# Patient Record
Sex: FEMALE | Race: WHITE | NOT HISPANIC OR LATINO | Employment: STUDENT | URBAN - METROPOLITAN AREA
[De-identification: names, ages, dates, MRNs, and addresses within clinical notes are randomized per-mention and may not be internally consistent; named-entity substitution may affect disease eponyms.]

---

## 2018-06-15 ENCOUNTER — OFFICE VISIT (OUTPATIENT)
Dept: FAMILY MEDICINE | Facility: CLINIC | Age: 18
End: 2018-06-15
Payer: COMMERCIAL

## 2018-06-15 ENCOUNTER — DOCUMENTATION ONLY (OUTPATIENT)
Dept: FAMILY MEDICINE | Facility: CLINIC | Age: 18
End: 2018-06-15

## 2018-06-15 ENCOUNTER — LAB VISIT (OUTPATIENT)
Dept: LAB | Facility: HOSPITAL | Age: 18
End: 2018-06-15
Attending: PHYSICIAN ASSISTANT
Payer: COMMERCIAL

## 2018-06-15 DIAGNOSIS — F41.9 ANXIETY: ICD-10-CM

## 2018-06-15 DIAGNOSIS — Z86.39 HISTORY OF IRON DEFICIENCY: ICD-10-CM

## 2018-06-15 DIAGNOSIS — F32.A DEPRESSION, UNSPECIFIED DEPRESSION TYPE: Primary | ICD-10-CM

## 2018-06-15 DIAGNOSIS — R53.83 FATIGUE, UNSPECIFIED TYPE: ICD-10-CM

## 2018-06-15 DIAGNOSIS — G47.19 EXCESSIVE DAYTIME SLEEPINESS: ICD-10-CM

## 2018-06-15 LAB
25(OH)D3+25(OH)D2 SERPL-MCNC: 19 NG/ML
ALBUMIN SERPL BCP-MCNC: 4.1 G/DL
ALP SERPL-CCNC: 66 U/L
ALT SERPL W/O P-5'-P-CCNC: 19 U/L
ANION GAP SERPL CALC-SCNC: 9 MMOL/L
AST SERPL-CCNC: 23 U/L
BASOPHILS # BLD AUTO: 0.05 K/UL
BASOPHILS NFR BLD: 0.5 %
BILIRUB SERPL-MCNC: 0.6 MG/DL
BUN SERPL-MCNC: 8 MG/DL
CALCIUM SERPL-MCNC: 9.5 MG/DL
CHLORIDE SERPL-SCNC: 106 MMOL/L
CO2 SERPL-SCNC: 24 MMOL/L
CREAT SERPL-MCNC: 0.8 MG/DL
DIFFERENTIAL METHOD: ABNORMAL
EOSINOPHIL # BLD AUTO: 0.1 K/UL
EOSINOPHIL NFR BLD: 1.2 %
ERYTHROCYTE [DISTWIDTH] IN BLOOD BY AUTOMATED COUNT: 12.8 %
EST. GFR  (AFRICAN AMERICAN): >60 ML/MIN/1.73 M^2
EST. GFR  (NON AFRICAN AMERICAN): >60 ML/MIN/1.73 M^2
FERRITIN SERPL-MCNC: 36 NG/ML
GLUCOSE SERPL-MCNC: 78 MG/DL
HCT VFR BLD AUTO: 43.7 %
HGB BLD-MCNC: 14.2 G/DL
IMM GRANULOCYTES # BLD AUTO: 0.06 K/UL
IMM GRANULOCYTES NFR BLD AUTO: 0.6 %
IRON SERPL-MCNC: 88 UG/DL
LYMPHOCYTES # BLD AUTO: 3.2 K/UL
LYMPHOCYTES NFR BLD: 30.4 %
MCH RBC QN AUTO: 28.5 PG
MCHC RBC AUTO-ENTMCNC: 32.5 G/DL
MCV RBC AUTO: 88 FL
MONOCYTES # BLD AUTO: 0.6 K/UL
MONOCYTES NFR BLD: 5.9 %
NEUTROPHILS # BLD AUTO: 6.5 K/UL
NEUTROPHILS NFR BLD: 61.4 %
NRBC BLD-RTO: 0 /100 WBC
PLATELET # BLD AUTO: 261 K/UL
PMV BLD AUTO: 13.9 FL
POTASSIUM SERPL-SCNC: 3.8 MMOL/L
PROT SERPL-MCNC: 7.5 G/DL
RBC # BLD AUTO: 4.98 M/UL
SATURATED IRON: 19 %
SODIUM SERPL-SCNC: 139 MMOL/L
TOTAL IRON BINDING CAPACITY: 466 UG/DL
TRANSFERRIN SERPL-MCNC: 315 MG/DL
TSH SERPL DL<=0.005 MIU/L-ACNC: 0.91 UIU/ML
VIT B12 SERPL-MCNC: 528 PG/ML
WBC # BLD AUTO: 10.59 K/UL

## 2018-06-15 PROCEDURE — 82607 VITAMIN B-12: CPT

## 2018-06-15 PROCEDURE — 3008F BODY MASS INDEX DOCD: CPT | Mod: CPTII,S$GLB,, | Performed by: PHYSICIAN ASSISTANT

## 2018-06-15 PROCEDURE — 99204 OFFICE O/P NEW MOD 45 MIN: CPT | Mod: S$GLB,,, | Performed by: PHYSICIAN ASSISTANT

## 2018-06-15 PROCEDURE — 80053 COMPREHEN METABOLIC PANEL: CPT

## 2018-06-15 PROCEDURE — 85025 COMPLETE CBC W/AUTO DIFF WBC: CPT

## 2018-06-15 PROCEDURE — 82728 ASSAY OF FERRITIN: CPT

## 2018-06-15 PROCEDURE — 36415 COLL VENOUS BLD VENIPUNCTURE: CPT | Mod: PO

## 2018-06-15 PROCEDURE — 82306 VITAMIN D 25 HYDROXY: CPT

## 2018-06-15 PROCEDURE — 99999 PR PBB SHADOW E&M-NEW PATIENT-LVL III: CPT | Mod: PBBFAC,,, | Performed by: PHYSICIAN ASSISTANT

## 2018-06-15 PROCEDURE — 83540 ASSAY OF IRON: CPT

## 2018-06-15 PROCEDURE — 84443 ASSAY THYROID STIM HORMONE: CPT

## 2018-06-15 RX ORDER — ESCITALOPRAM OXALATE 10 MG/1
10 TABLET ORAL DAILY
Qty: 30 TABLET | Refills: 0 | Status: SHIPPED | OUTPATIENT
Start: 2018-06-15 | End: 2018-07-06 | Stop reason: SDUPTHER

## 2018-06-15 RX ORDER — BUSPIRONE HYDROCHLORIDE 7.5 MG/1
7.5 TABLET ORAL 3 TIMES DAILY PRN
Qty: 90 TABLET | Refills: 0 | Status: SHIPPED | OUTPATIENT
Start: 2018-06-15 | End: 2018-07-06

## 2018-06-15 RX ORDER — IBUPROFEN 600 MG/1
600 TABLET ORAL 3 TIMES DAILY
COMMUNITY

## 2018-06-15 NOTE — PROGRESS NOTES
Pre-Visit Chart Review  For Appointment Scheduled on 06/15/18    There are no preventive care reminders to display for this patient.

## 2018-06-15 NOTE — PROGRESS NOTES
"Subjective:       Patient ID: Veena Guerrero is a 17 y.o. female.    Chief Complaint: Depression    HPI   Patient is a 17 year old  female presenting to the clinic with her mother "because of near of a breakdown." Patient reports having increased stress/depression build up over the past few months due to issues with her job/living situation. This got suddenly worse within the last few days after the break-up of her boyfriend. Patient states she has not been able to eat, sleep, get out of bed. Patient had signed a release with her boyfriend in [place], [place] and now she has no way of paying for the apartment. She doesn't think she will be able to keep her (2) jobs in [place], [place] or be able to continue with school that she is enrolled in. Her mother is very concerned, but is a  and is currently housing 5 foster children for which patient would have to share a room with. She feels like she has no where to go. There are possible options with a brother that lives in [place] or a sister that lives [place], [place]. She denies any [place], [place].    aPtient also expresses concern regarding near falling asleep while driving between jobs. She states this has been going on for months and is concerned that maybe her iron is low.  Review of Systems   Constitutional: Negative for activity change, appetite change, chills, diaphoresis, fatigue and fever.   HENT: Negative for congestion, postnasal drip and rhinorrhea.    Respiratory: Negative.  Negative for cough, shortness of breath and wheezing.    Cardiovascular: Negative.  Negative for chest pain.   Gastrointestinal: Negative for abdominal pain, blood in stool, constipation, diarrhea, nausea and vomiting.   Genitourinary: Negative for dysuria, frequency, hematuria and urgency.   Musculoskeletal: Negative.    Skin: Negative.  Negative for color change and rash.   Neurological: Negative for dizziness and syncope.   Psychiatric/Behavioral: Positive for dysphoric mood " and sleep disturbance. Negative for agitation, behavioral problems, confusion, self-injury and suicidal ideas. The patient is nervous/anxious.        Objective:      Physical Exam   Constitutional: Vital signs are normal. She appears well-developed and well-nourished. No distress.   Cardiovascular: Normal rate, regular rhythm, S1 normal, S2 normal and normal heart sounds.  Exam reveals no gallop.    No murmur heard.  Pulses:       Radial pulses are 2+ on the right side, and 2+ on the left side.   <2sec cap refill fingers bilat     Pulmonary/Chest: Effort normal and breath sounds normal. No respiratory distress. She has no wheezes. She has no rhonchi.   Skin: Skin is warm and dry. She is not diaphoretic.   Appropriate skin turgor   Psychiatric: Her speech is normal and behavior is normal. Judgment and thought content normal. Thought content is not paranoid and not delusional. Cognition and memory are normal. She exhibits a depressed mood. She expresses no homicidal and no suicidal ideation. She expresses no suicidal plans and no homicidal plans.   Patient tearful in clinic       Assessment:       1. Depression, unspecified depression type    2. Fatigue, unspecified type    3. Excessive daytime sleepiness    4. History of iron deficiency        Plan:       Veena VIDAL was seen today for depression.    Diagnoses and all orders for this visit:    Depression, unspecified depression type  -     CBC auto differential; Future  -     Comprehensive metabolic panel; Future  -     TSH; Future    -     escitalopram oxalate (LEXAPRO) 10 MG tablet; Take 1 tablet (10 mg total) by mouth once daily.  -     busPIRone (BUSPAR) 7.5 MG tablet; Take 1 tablet (7.5 mg total) by mouth 3 (three) times daily as needed.  I've explained to her that drugs of the SSRI class can have side effects such as weight gain, sexual dysfunction, insomnia, headache, nausea. These medications are generally effective at alleviating symptoms of anxiety and/or  depression. Let me know if significant side effects do occur.  .3-4 week followup scheduled          Fatigue, unspecified type  -     CBC auto differential; Future  -     Comprehensive metabolic panel; Future  -     TSH; Future  -     Vitamin D; Future  -     Iron and TIBC; Future  -     Ferritin; Future  -     Vitamin B12; Future    Excessive daytime sleepiness  -     CBC auto differential; Future  -     Comprehensive metabolic panel; Future  -     TSH; Future  -     Vitamin D; Future  -     Iron and TIBC; Future  -     Ferritin; Future    History of iron deficiency  -     Iron and TIBC; Future  -     Ferritin; Future    Other orders  -     escitalopram oxalate (LEXAPRO) 10 MG tablet; Take 1 tablet (10 mg total) by mouth once daily.  -     busPIRone (BUSPAR) 7.5 MG tablet; Take 1 tablet (7.5 mg total) by mouth 3 (three) times daily as needed.

## 2018-06-17 VITALS
WEIGHT: 154.13 LBS | HEART RATE: 82 BPM | TEMPERATURE: 98 F | SYSTOLIC BLOOD PRESSURE: 126 MMHG | DIASTOLIC BLOOD PRESSURE: 66 MMHG | HEIGHT: 59 IN | BODY MASS INDEX: 31.07 KG/M2

## 2018-06-18 ENCOUNTER — TELEPHONE (OUTPATIENT)
Dept: FAMILY MEDICINE | Facility: CLINIC | Age: 18
End: 2018-06-18

## 2018-06-18 DIAGNOSIS — R79.89 LOW VITAMIN D LEVEL: Primary | ICD-10-CM

## 2018-06-18 DIAGNOSIS — R79.89 LOW VITAMIN D LEVEL: ICD-10-CM

## 2018-06-18 RX ORDER — ERGOCALCIFEROL 1.25 MG/1
50000 CAPSULE ORAL
Qty: 12 CAPSULE | Refills: 1 | Status: SHIPPED | OUTPATIENT
Start: 2018-06-18

## 2018-06-18 RX ORDER — ERGOCALCIFEROL 1.25 MG/1
50000 CAPSULE ORAL
Qty: 12 CAPSULE | Refills: 1 | Status: SHIPPED | OUTPATIENT
Start: 2018-06-18 | End: 2018-06-18 | Stop reason: SDUPTHER

## 2018-06-18 NOTE — TELEPHONE ENCOUNTER
----- Message from ML Dorado sent at 6/18/2018  7:50 AM CDT -----  Blood counts, electrolytes, thyroid, b12 all normal.   Iron is only very minimally decreased and I do not think this is the cause of her fatigue.  Vitamin D is low. Needs to start high dose vitamin D (50,000 units/weekly) and repeat this lab in 12 weeks.

## 2018-06-18 NOTE — TELEPHONE ENCOUNTER
Spoke to patient.   Patient notified of results and states understanding.  Requesting vitamin D rx [facility] in [place]

## 2018-07-05 ENCOUNTER — DOCUMENTATION ONLY (OUTPATIENT)
Dept: FAMILY MEDICINE | Facility: CLINIC | Age: 18
End: 2018-07-05

## 2018-07-05 NOTE — PROGRESS NOTES
Pre-Visit Chart Review  For Appointment Scheduled on 07/06/18    Health Maintenance Due   Topic Date Due    Lipid Panel  1998    HPV Vaccines (1 of 3 - Female 3 Dose Series) 09/16/2009    TETANUS VACCINE  09/16/2016

## 2018-07-06 ENCOUNTER — TELEPHONE (OUTPATIENT)
Dept: FAMILY MEDICINE | Facility: CLINIC | Age: 18
End: 2018-07-06

## 2018-07-06 ENCOUNTER — OFFICE VISIT (OUTPATIENT)
Dept: FAMILY MEDICINE | Facility: CLINIC | Age: 18
End: 2018-07-06
Payer: COMMERCIAL

## 2018-07-06 VITALS
SYSTOLIC BLOOD PRESSURE: 136 MMHG | DIASTOLIC BLOOD PRESSURE: 88 MMHG | WEIGHT: 155.88 LBS | HEIGHT: 59 IN | TEMPERATURE: 98 F | HEART RATE: 73 BPM | BODY MASS INDEX: 31.43 KG/M2

## 2018-07-06 DIAGNOSIS — F41.8 MIXED ANXIETY AND DEPRESSIVE DISORDER: Primary | ICD-10-CM

## 2018-07-06 PROCEDURE — 99999 PR PBB SHADOW E&M-EST. PATIENT-LVL III: CPT | Mod: PBBFAC,,, | Performed by: PHYSICIAN ASSISTANT

## 2018-07-06 PROCEDURE — 3008F BODY MASS INDEX DOCD: CPT | Mod: CPTII,S$GLB,, | Performed by: PHYSICIAN ASSISTANT

## 2018-07-06 PROCEDURE — 99214 OFFICE O/P EST MOD 30 MIN: CPT | Mod: S$GLB,,, | Performed by: PHYSICIAN ASSISTANT

## 2018-07-06 RX ORDER — BUSPIRONE HYDROCHLORIDE 15 MG/1
15 TABLET ORAL 2 TIMES DAILY
Qty: 180 TABLET | Refills: 1 | Status: SHIPPED | OUTPATIENT
Start: 2018-07-06 | End: 2019-07-06

## 2018-07-06 RX ORDER — ESCITALOPRAM OXALATE 10 MG/1
10 TABLET ORAL DAILY
Qty: 90 TABLET | Refills: 1 | Status: SHIPPED | OUTPATIENT
Start: 2018-07-06 | End: 2019-07-06

## 2018-07-06 NOTE — TELEPHONE ENCOUNTER
----- Message from Adair Prieto sent at 7/6/2018 10:07 AM CDT -----  Contact: Pt  Patient calling had a 10 oclock appointment, had a flat tire really would like to get in today, would like to be seen by anyone in the office please   [phone]

## 2018-07-06 NOTE — TELEPHONE ENCOUNTER
----- Message from Katty Live sent at 7/6/2018  9:48 AM CDT -----  Contact: Patient  Patient is calling to see if she can be a little late to her appointment because [place] and then heading there.  Call Back#[phone]  Thanks

## 2018-07-06 NOTE — PROGRESS NOTES
Subjective:       Patient ID: Veena Guerrero is a 17 y.o. female.    Chief Complaint: Follow-up    HPI   Patient is a 17 year old  female presenting to the clinic for follow-up BRISA. She was started on lexapro 10mg daily and BuSpar 7.5mg TID. She reports good improvement in the last month. She got a raise/promotion at work and is now able to live closer to her apartment. She reports that her boyfriend and her are getting back together/taking things slow, but she is focusing on herself. She reports good improvement with the medications, but feels like lexapro may be keeping her from sleeping. She is taking BuSpar 7.5mg in the morning and 15mg at bedtime with good relief.   Review of Systems   Constitutional: Negative for activity change, appetite change, chills, diaphoresis, fatigue and fever.   HENT: Negative for congestion, postnasal drip and rhinorrhea.    Respiratory: Negative.  Negative for cough, shortness of breath and wheezing.    Cardiovascular: Negative.  Negative for chest pain.   Gastrointestinal: Negative for abdominal pain, blood in stool, constipation, diarrhea, nausea and vomiting.   Genitourinary: Negative for dysuria, frequency, hematuria and urgency.   Musculoskeletal: Negative.    Skin: Negative.  Negative for color change and rash.   Neurological: Negative for dizziness and syncope.   Psychiatric/Behavioral: Positive for sleep disturbance. Negative for agitation, behavioral problems and confusion.       Objective:      Physical Exam   Constitutional: Vital signs are normal. She appears well-developed and well-nourished. No distress.   Cardiovascular: Normal rate, regular rhythm, S1 normal, S2 normal and normal heart sounds.  Exam reveals no gallop.    No murmur heard.  Pulses:       Radial pulses are 2+ on the right side, and 2+ on the left side.   <2sec cap refill fingers bilat     Pulmonary/Chest: Effort normal and breath sounds normal. No respiratory distress. She has no wheezes.  She has no rhonchi.   Skin: Skin is warm and dry. She is not diaphoretic.   Appropriate skin turgor   Psychiatric: She has a normal mood and affect. Her speech is normal and behavior is normal. Judgment and thought content normal. Cognition and memory are normal.       Assessment:       1. Mixed anxiety and depressive disorder        Plan:       Veena VIDAL was seen today for follow-up.    Diagnoses and all orders for this visit:    Mixed anxiety and depressive disorder  Improving; switch to lexapro to AM dosing  Increase BuSpar to 15mg BID as needed for anxiety  -     busPIRone (BUSPAR) 15 MG tablet; Take 1 tablet (15 mg total) by mouth 2 (two) times daily.  -     escitalopram oxalate (LEXAPRO) 10 MG tablet; Take 1 tablet (10 mg total) by mouth once daily.    Patient will email/call us if she decides she needs to increase the lexapro or still having difficulty switching after switching to taking the medication in the AM.

## 2018-07-31 ENCOUNTER — PATIENT MESSAGE (OUTPATIENT)
Dept: FAMILY MEDICINE | Facility: CLINIC | Age: 18
End: 2018-07-31

## 2018-07-31 NOTE — TELEPHONE ENCOUNTER
Please review pts My Chart message and advise. [facility] [place] [facility]. (I have some at my desk.) Thanks, Jailyn

## 2018-08-01 ENCOUNTER — PATIENT MESSAGE (OUTPATIENT)
Dept: FAMILY MEDICINE | Facility: CLINIC | Age: 18
End: 2018-08-01